# Patient Record
Sex: FEMALE | Race: OTHER
[De-identification: names, ages, dates, MRNs, and addresses within clinical notes are randomized per-mention and may not be internally consistent; named-entity substitution may affect disease eponyms.]

---

## 2017-09-09 ENCOUNTER — HOSPITAL ENCOUNTER (EMERGENCY)
Dept: HOSPITAL 80 - FED | Age: 22
Discharge: HOME | End: 2017-09-09
Payer: SELF-PAY

## 2017-09-09 VITALS — DIASTOLIC BLOOD PRESSURE: 70 MMHG | SYSTOLIC BLOOD PRESSURE: 129 MMHG | HEART RATE: 65 BPM | OXYGEN SATURATION: 97 %

## 2017-09-09 VITALS — TEMPERATURE: 98.6 F | RESPIRATION RATE: 16 BRPM

## 2017-09-09 DIAGNOSIS — E86.9: ICD-10-CM

## 2017-09-09 DIAGNOSIS — R53.83: Primary | ICD-10-CM

## 2017-09-09 LAB
% IMMATURE GRANULYOCYTES: 0.2 % (ref 0–1.1)
ABSOLUTE IMMATURE GRANULOCYTES: 0.01 10^3/UL (ref 0–0.1)
ABSOLUTE NRBC COUNT: 0 10^3/UL (ref 0–0.01)
ADD DIFF?: NO
ADD MORPH?: NO
ADD SCAN?: NO
ANION GAP SERPL CALC-SCNC: 12 MEQ/L (ref 8–16)
ATYPICAL LYMPHOCYTE FLAG: 40 (ref 0–99)
CALCIUM SERPL-MCNC: 9.3 MG/DL (ref 8.5–10.4)
CHLORIDE SERPL-SCNC: 104 MEQ/L (ref 97–110)
CO2 SERPL-SCNC: 27 MEQ/L (ref 22–31)
COLOR UR: YELLOW
CREAT SERPL-MCNC: 0.7 MG/DL (ref 0.6–1)
ERYTHROCYTE [DISTWIDTH] IN BLOOD BY AUTOMATED COUNT: 12 % (ref 11.5–15.2)
FRAGMENT RBC FLAG: 0 (ref 0–99)
GFR SERPL CREATININE-BSD FRML MDRD: > 60 ML/MIN/{1.73_M2}
GLUCOSE SERPL-MCNC: 60 MG/DL (ref 70–100)
HCT VFR BLD CALC: 43.4 % (ref 38–47)
HGB BLD-MCNC: 13.8 G/DL (ref 12.6–16.3)
LEFT SHIFT FLG: 0 (ref 0–99)
LIPEMIA HEMOLYSIS FLAG: 80 (ref 0–99)
MCH RBC BLDCO QN: 31.4 PG (ref 27.9–34.1)
MCHC RBC AUTO-ENTMCNC: 31.8 G/DL (ref 32.4–36.7)
MCV RBC AUTO: 98.9 FL (ref 81.5–99.8)
NITRITE UR QL STRIP: NEGATIVE
NRBC-AUTO%: 0 % (ref 0–0.2)
PH UR STRIP: 7 [PH] (ref 5–7.5)
PLATELET # BLD: 175 10^3/UL (ref 150–400)
PLATELET CLUMPS FLAG: 0 (ref 0–99)
PMV BLD AUTO: 10.6 FL (ref 8.7–11.7)
POTASSIUM SERPL-SCNC: 3.4 MEQ/L (ref 3.5–5.2)
RBC # BLD AUTO: 4.39 10^6/UL (ref 4.18–5.33)
SODIUM SERPL-SCNC: 143 MEQ/L (ref 134–144)
SP GR UR STRIP: 1.02 (ref 1–1.03)

## 2017-09-09 NOTE — EDPHY
H & P


Time Seen by Provider: 09/09/17 18:52


HPI/ROS: 





CHIEF COMPLAINT:  Fatigue, loss of appetite





HISTORY OF PRESENT ILLNESS:  Patient is a 22-year-old female who presents 

emergency department feeling fatigued for the past 4 days.  Patient has a 

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 This was benign.  She 

subsequently had adrenal insufficiency.  She is not currently being treated for 

adrenal insufficiency.  Patient complains of generalized fatigue.  Nausea with 

no vomiting.  No focal abdominal discomfort.  No abdominal pain.  No shortness 

of breath.  No chest pain.  Patient denies dysuria but does have mild 

frequency.  No hematuria.  Patient's last menstrual period was 1 month ago.  

She has 1 day late on her menses.  She denies being sexually active in the past 

month.





REVIEW OF SYSTEMS:  


My complete review of systems is negative except as mentioned in the HPI.


Past Medical/Surgical History: 





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 

vocal cord dysfunction, scoliosis





Past surgical history:  Left adrenal gland removal





Social history:  The patient does not smoke.








Smoking Status: Never smoked


Physical Exam: 








Vitals noted.  Afebrile.


GENERAL:  Well-appearing, in no acute distress, alert.


HEENT:  Eyes normal to inspection, normal pharynx, no signs of dehydration.


NECK:  No thyromegaly, no lymphadenopathy, supple.


RESPIRATORY:  Clear to auscultation bilaterally, no rales, rhonchi or wheezing.


CVS:  Regular rate and rhythm, no rubs, murmurs, or gallops.


ABDOMEN:  Soft, nontender, nondistended, no organomegaly.


BACK:  Normal to inspection, no CVA tenderness.


SKIN:  Normal color, no rash, warm, dry.  No pallor.


EXTREMITIES:  No pedal edema, no calf tenderness, no Homans sign or cords, no 

joint swelling.


NEURO/PSYCH:  Alert and oriented x3, normal mood and affect, normal motor 

sensory exam. 


Constitutional: 


 Initial Vital Signs











Temperature (C)  37.0 C   09/09/17 17:57


 


Heart Rate  80   09/09/17 17:57


 


Respiratory Rate  16   09/09/17 17:57


 


Blood Pressure  130/76 H  09/09/17 17:57


 


O2 Sat (%)  95   09/09/17 17:57








 











O2 Delivery Mode               Room Air














Allergies/Adverse Reactions: 


 





No Known Allergies Allergy (Unverified 09/09/17 17:57)


 








Home Medications: 














 Medication  Instructions  Recorded


 


Omeprazole  09/09/17


 


predniSONE 20 mg PO DAILY 4 Days  tab 09/09/17














Medical Decision Making


ED Course/Re-evaluation: 





In the emergency department I discussed possible etiologies with the patient 

and her mother.  I answered all her questions.  IV was placed.  Patient was 

given normal saline IV for hydration.  Laboratory studies were obtained.





Reviewed the patient's laboratory studies.  She is not anemic.  Her white count 

is normal.  Patient has a mildly low potassium at 3.4.  Breast her Chem panel 

is unremarkable.  Pregnancy is negative.  Urine is negative.





I discussed the results with the patient and her mother.  I had a long 

discussion with possible etiologies.  Because the patient has a history of 

adrenal insufficiency when young she will be given a short course of steroid.  

She will follow up with primary care physician.  She will return to the 

emergency department if her symptoms worsen.  She is given warnings prior to 

leaving.  


Differential Diagnosis: 





My differential includes but is not limited to adrenal insufficiency, 

electrolyte abnormality, sugar abnormality, dehydration, urinary tract infection

, pregnancy, ectopic pregnancy





- Data Points


Laboratory Results: 


 Laboratory Results





 09/09/17 19:14 





 09/09/17 19:14 





 











  09/09/17 09/09/17 09/09/17





  19:14 19:14 19:14


 


WBC      





    


 


RBC      





    


 


Hgb      





    


 


Hct      





    


 


MCV      





    


 


MCH      





    


 


MCHC      





    


 


RDW      





    


 


Plt Count      





    


 


MPV      





    


 


Neut % (Auto)      





    


 


Lymph % (Auto)      





    


 


Mono % (Auto)      





    


 


Eos % (Auto)      





    


 


Baso % (Auto)      





    


 


Nucleat RBC Rel Count      





    


 


Absolute Neuts (auto)      





    


 


Absolute Lymphs (auto)      





    


 


Absolute Monos (auto)      





    


 


Absolute Eos (auto)      





    


 


Absolute Basos (auto)      





    


 


Absolute Nucleated RBC      





    


 


Immature Gran %      





    


 


Immature Gran #      





    


 


Sodium      143 mEq/L mEq/L





     (134-144) 


 


Potassium      3.4 mEq/L L mEq/L





     (3.5-5.2) 


 


Chloride      104 mEq/L mEq/L





     () 


 


Carbon Dioxide      27 mEq/l mEq/l





     (22-31) 


 


Anion Gap      12 mEq/L mEq/L





     (8-16) 


 


BUN      12 mg/dL mg/dL





     (7-23) 


 


Creatinine      0.7 mg/dL mg/dL





     (0.6-1.0) 


 


Estimated GFR      > 60 





    


 


Glucose      60 mg/dL L mg/dL





     () 


 


Calcium      9.3 mg/dL mg/dL





     (8.5-10.4) 


 


Beta HCG, Qual    NEGATIVE   





    


 


Urine Color  YELLOW     





    


 


Urine Appearance  CLEAR     





    


 


Urine pH  7.0     





   (5.0-7.5)   


 


Ur Specific Gravity  1.018     





   (1.002-1.030)   


 


Urine Protein  NEGATIVE     





   (NEGATIVE)   


 


Urine Ketones  NEGATIVE     





   (NEGATIVE)   


 


Urine Blood  NEGATIVE     





   (NEGATIVE)   


 


Urine Nitrate  NEGATIVE     





   (NEGATIVE)   


 


Urine Bilirubin  NEGATIVE     





   (NEGATIVE)   


 


Urine Urobilinogen  NEGATIVE EU EU    





   (0.2-1.0)   


 


Ur Leukocyte Esterase  NEGATIVE     





   (NEGATIVE)   


 


Urine Glucose  NEGATIVE     





   (NEGATIVE)   














  09/09/17





  19:14


 


WBC  6.31 10^3/uL 10^3/uL





   (3.80-9.50) 


 


RBC  4.39 10^6/uL 10^6/uL





   (4.18-5.33) 


 


Hgb  13.8 g/dL g/dL





   (12.6-16.3) 


 


Hct  43.4 % %





   (38.0-47.0) 


 


MCV  98.9 fL fL





   (81.5-99.8) 


 


MCH  31.4 pg pg





   (27.9-34.1) 


 


MCHC  31.8 g/dL L g/dL





   (32.4-36.7) 


 


RDW  12.0 % %





   (11.5-15.2) 


 


Plt Count  175 10^3/uL 10^3/uL





   (150-400) 


 


MPV  10.6 fL fL





   (8.7-11.7) 


 


Neut % (Auto)  52.4 % %





   (39.3-74.2) 


 


Lymph % (Auto)  36.0 % %





   (15.0-45.0) 


 


Mono % (Auto)  8.4 % %





   (4.5-13.0) 


 


Eos % (Auto)  2.2 % %





   (0.6-7.6) 


 


Baso % (Auto)  0.8 % %





   (0.3-1.7) 


 


Nucleat RBC Rel Count  0.0 % %





   (0.0-0.2) 


 


Absolute Neuts (auto)  3.31 10^3/uL 10^3/uL





   (1.70-6.50) 


 


Absolute Lymphs (auto)  2.27 10^3/uL 10^3/uL





   (1.00-3.00) 


 


Absolute Monos (auto)  0.53 10^3/uL 10^3/uL





   (0.30-0.80) 


 


Absolute Eos (auto)  0.14 10^3/uL 10^3/uL





   (0.03-0.40) 


 


Absolute Basos (auto)  0.05 10^3/uL 10^3/uL





   (0.02-0.10) 


 


Absolute Nucleated RBC  0.00 10^3/uL 10^3/uL





   (0-0.01) 


 


Immature Gran %  0.2 % %





   (0.0-1.1) 


 


Immature Gran #  0.01 10^3/uL 10^3/uL





   (0.00-0.10) 


 


Sodium  





  


 


Potassium  





  


 


Chloride  





  


 


Carbon Dioxide  





  


 


Anion Gap  





  


 


BUN  





  


 


Creatinine  





  


 


Estimated GFR  





  


 


Glucose  





  


 


Calcium  





  


 


Beta HCG, Qual  





  


 


Urine Color  





  


 


Urine Appearance  





  


 


Urine pH  





  


 


Ur Specific Gravity  





  


 


Urine Protein  





  


 


Urine Ketones  





  


 


Urine Blood  





  


 


Urine Nitrate  





  


 


Urine Bilirubin  





  


 


Urine Urobilinogen  





  


 


Ur Leukocyte Esterase  





  


 


Urine Glucose  





  











Medications Given: 


 








Discontinued Medications





Sodium Chloride (Ns)  1,000 mls @ 0 mls/hr IV EDNOW ONE; [place]


   PRN Reason: Protocol


   Stop: 09/09/17 19:05


   Last Admin: 09/09/17 19:24 Dose:  1,000 mls








Departure





- Departure


Disposition: Home, Routine, Self-Care


Clinical Impression: 


Fatigue


Qualifiers:


 Fatigue type: unspecified Qualified Code(s): R53.83 - Other fatigue





Condition: Good


Instructions:  Fatigue (ED)


Additional Instructions: 


Return with increasing fatigue, weakness, fever, nausea, vomiting or any other 

concerns.


Referrals: 


Meryl Skinner MD [[facility]] - 2-3 days, call for 

appt.


Prescriptions: 


predniSONE 20 mg PO DAILY 4 Days  tab

## 2018-01-24 ENCOUNTER — HOSPITAL ENCOUNTER (EMERGENCY)
Dept: HOSPITAL 80 - FED | Age: 23
Discharge: HOME | End: 2018-01-24
Payer: COMMERCIAL

## 2018-01-24 VITALS
OXYGEN SATURATION: 98 % | TEMPERATURE: 97.9 F | HEART RATE: 56 BPM | DIASTOLIC BLOOD PRESSURE: 94 MMHG | SYSTOLIC BLOOD PRESSURE: 133 MMHG

## 2018-01-24 VITALS — RESPIRATION RATE: 18 BRPM

## 2018-01-24 DIAGNOSIS — R07.82: Primary | ICD-10-CM

## 2018-01-24 DIAGNOSIS — J45.909: ICD-10-CM

## 2018-01-24 LAB — PLATELET # BLD: 176 10^3/UL (ref 150–400)

## 2018-01-24 NOTE — CPEKG
Heart Rate: 71

RR Interval: 845

P-R Interval: 152

QRSD Interval: 104

QT Interval: 376

QTC Interval: 409

P Axis: 62

QRS Axis: 53

T Wave Axis: 60

EKG Severity - NORMAL ECG -

EKG Impression: SINUS RHYTHM

Electronically Signed By: Cheri Willis 24-Jan-2018 20:43:30

## 2018-01-24 NOTE — EDPHY
H & P


Time Seen by Provider: 01/24/18 15:27


HPI/ROS: 





CHIEF COMPLAINT: Chest pain 





HISTORY OF PRESENT ILLNESS: 





This patient is a 23 year old female with history of adrenal insufficiency and 

mitral valve prolapse complaining of chest pain. Chest pain started yesterday 

and became worse today. The pain has been constant, described as slight 

heaviness with an occasional stinging sensation. It has been constant since 

onset, but waxes and wanes.The patient rates the severity of her discomfort at 7

/10.  No change with exertion.  She cannot identify any alleviating or 

aggravating factors and has not had similar pain in the past.  Associated with 

shortness of breath. She tried her inhaler thinking this may be due to asthma, 

but the sensation remained. She endorses slight cough and a transient sensation 

of lightheadedness at work earlier. She is currently going through a divorce 

and has felt very stressed. Denies pain or swelling in legs. No fever, 

abdominal pain, vomiting, urinary complaints, or other associated symptoms. 





REVIEW OF SYSTEMS:


A 10 point review of systems was performed and is negative with the exception 

of the elements mentioned in the history of present illness. 











Past Medical/Surgical History: 





GERD (Omeprazole) 


Adrenal insufficiency 


Mitral valve prolapse 


Asthma 





Social History: 





Nonsmoker. No alcohol or illicit drug use. 


Smoking Status: Never smoked


Physical Exam: 





General Appearance: Alert, pleasant


Eyes: Pupils equal and round, no conjunctival pallor or injection


ENT, Mouth: Mucous membranes moist


Neck: Normal inspection


Respiratory: Lungs are clear to auscultation


Cardiovascular: Regular rate and rhythm 


Gastrointestinal:  Abdomen is soft and non-tender 


Neurological:  A&O, nonfocal exam


Skin:  Warm and dry, no rash


Extremities:  Nontender, no pedal edema


Psychiatric: Mood and affect normal





Constitutional: 


 Initial Vital Signs











Temperature (C)  36.8 C   01/24/18 15:22


 


Heart Rate  76   01/24/18 15:22


 


Respiratory Rate  18   01/24/18 15:22


 


Blood Pressure  111/59 L  01/24/18 15:22


 


O2 Sat (%)  99   01/24/18 15:22








 











O2 Delivery Mode               Room Air














Allergies/Adverse Reactions: 


 





No Known Allergies Allergy (Verified 01/24/18 15:22)


 








Home Medications: 














 Medication  Instructions  Recorded


 


Omeprazole  09/09/17














Medical Decision Making





- Diagnostics


EKG Interpretation: 





EKG interpreted by me reveals sinus arrhythmia, rate 71, no ST/T changes.  

Interpretation: sinus arrhythmia.





Imaging Results: 


CXR: NAD





Imaging: I viewed and interpreted images myself


ED Course/Re-evaluation: 





24 y/o female presents with chest pain onset yesterday. Exam is unremarkable. 

Plan for EKG, chest x-ray, labs including CBC, chemistries, d-dimer. IV 

established. Plan to administer 15mg IV Toradol for pain relief and 0.5mg IV 

Ativan for anxiety relief. 





EKG shows sinus arrhythmia. Laboratory and radiographic studies unremarkable. D-

dimer negative.  





After careful consideration and evaluation, I find no evidence of acute 

coronary syndrome.  The patient has no risk factors for coronary disease, 

normal EKG and normal studies.  In addition, I feel that I can safely exclude 

pulmonary embolism, with normal vital signs, normal oxygen saturation and no 

risk factors.  In addition there is no evidence of pneumothorax, pneumonia, 

aortic dissection.





16:25 Reassessed patient. She is feeling better following Ativan and Toradol 

administration. Plan to d/c home in good condition. Followup and return 

precautions discussed. She is comfortable with this plan. 


Differential Diagnosis: 





Differential diagnosis includes though it is not limited to pneumonia, 

pneumothorax, pulmonary embolism, aortic dissection, pericarditis, acute 

coronary syndrome.








- Data Points


Laboratory Results: 


 Laboratory Results





 01/24/18 15:50 





 01/24/18 15:50 








Medications Given: 


 








Discontinued Medications





Ketorolac Tromethamine (Toradol)  15 mg IVP EDNOW ONE


   Stop: 01/24/18 15:39


   Last Admin: 01/24/18 16:03 Dose:  15 mg


Lorazepam (Ativan Injection)  0.5 mg IVP EDNOW ONE


   Stop: 01/24/18 15:39


   Last Admin: 01/24/18 16:04 Dose:  0.5 mg








Departure





- Departure


Disposition: Home, Routine, Self-Care


Clinical Impression: 


Chest pain


Qualifiers:


 Chest pain type: intercostal pain Qualified Code(s): R07.82 - Intercostal pain





Condition: Good


Instructions:  Chest Pain (ED)


Additional Instructions: 


1. Based upon the testing done in the Emergency Department today we see no 

evidence of a heart attack.





2. Please follow up with a primary care provider this week for further 

evaluation. 





3. Please return to the Emergency Department immediately for worsening chest 

pain, difficulty breathing or other concerns.





Referrals: 


Cally Casanova MD [Medical Doctor] - As per Instructions


Report Scribed for: Cheri Willis


Report Scribed by: Anai Resendez


Date of Report: 01/24/18


Time of Report: 15:37


Physician Review and Approval Statement: 





01/24/18 15:37


Portions of this note were transcribed by a medical scribe.  I personally 

performed a history, physical exam, medical decision making, and confirmed 

accuracy of information the transcribed note.

## 2018-06-04 ENCOUNTER — HOSPITAL ENCOUNTER (EMERGENCY)
Dept: HOSPITAL 80 - FED | Age: 23
Discharge: HOME | End: 2018-06-04
Payer: COMMERCIAL

## 2018-06-04 VITALS — SYSTOLIC BLOOD PRESSURE: 133 MMHG | DIASTOLIC BLOOD PRESSURE: 77 MMHG

## 2018-06-04 DIAGNOSIS — Z91.040: ICD-10-CM
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
## 2018-06-04 DIAGNOSIS — S69.91XA: Primary | ICD-10-CM
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
## 2018-06-04 NOTE — EDPHY
H & P


Stated Complaint: R HAND INJ/FALLING


Time Seen by Provider: 06/04/18 01:09


HPI/ROS: 





Chief Complaint:  Right hand injury





HPI:  23-year-old woman was playing 4 square with friends this morning when she 

fell onto her outstretched right hand and hyperflexed it.  She had immediate 

onset of pain in her mid hand.  Has a history of prior fractures in the past.  

Denies any other injuries.  No numbness or weakness.  She is on able to use her 

hand secondary to pain.





ROS:  10 point Review of Systems is negative except as noted in the HPI.





Social History: No smoking





Family History: non-contributory





Physical Exam:


General:  Awake, alert, no acute distress


Right upper extremity:  Elbow is nontender, full range of motion without pain.  

Right wrist, no bony tenderness or deformity, decreased range of motion 

secondary to pain in her hand, right hand:  She has tenderness along the 2nd 

and 3rd mid shaft metacarpals.  No carpal tenderness or deformity.  She is 

unable to flex or extend her 3rd 4th and 5th digits secondary to pain.  

Sensations intact in the radial, median and ulnar nerve distribution.  

Capillary refills less than 2 sec.  Normal pulses.


Skin:  No rash








- Personal History


LMP (Females 10-55): 22-28 Days Ago


Current Tetanus Diphtheria and Acellular Pertussis (TDAP): Yes





- Medical/Surgical History


Hx Asthma: No


Hx Chronic Respiratory Disease: No


Hx Diabetes: No


Hx Cardiac Disease: No


Hx Renal Disease: No


Hx Cirrhosis: No


Hx Alcoholism: No


Hx HIV/AIDS: No


Hx Splenectomy or Spleen Trauma: No


Other PMH: mitral valve prolapse/afrenal tumor, ADRENAL INSUF, GERD, VOCAL CORD 

DISFUNCTION, FEMORAL HIP SLIP WILLA





- Social History


Smoking Status: Never smoked


Constitutional: 


 Initial Vital Signs











Temperature (C)  36.7 C   06/04/18 01:02


 


Heart Rate  94   06/04/18 01:02


 


Respiratory Rate  16   06/04/18 01:02


 


Blood Pressure  133/77 H  06/04/18 01:02


 


O2 Sat (%)  97   06/04/18 01:02








 











O2 Delivery Mode               Room Air














Allergies/Adverse Reactions: 


 





latex Allergy (Verified 06/04/18 01:01)


 








Home Medications: 














 Medication  Instructions  Recorded


 


Omeprazole  09/09/17


 


Flonase Nasal [place]  06/04/18


 


Ventolin Hfa Inhaler  06/04/18














Medical Decision Making





- Diagnostics


Imaging Results: 


Hand x-ray shows a questionable old fractures the base of the 4th soft fish 

metacarpal.  No acute fractures to noted.  This is my interpretation.


Imaging: I viewed and interpreted images myself


ED Course/Re-evaluation: 





Patient placed in a volar splint.  Will discharge with follow up with Hand 

surgery, return for any concerns.





Departure





- Departure


Disposition: Home, Routine, Self-Care


Clinical Impression: 


 Hand injury





Condition: Good


Instructions:  Hand Sprain (ED), R.I.C.E. Treatment (ED)


Additional Instructions: 


Leave the splint in place until your seen by the hand surgeon.


Follow up with the hand surgeon in 3-4 days for further evaluation.


Alternate acetaminophen (1000 mg) with ibuprofen (400 mg) every 4 hours as 

needed for pain.


Referrals: 


Siddhartha Hernandez MD [Medical Doctor] - As per Instructions


Stand Alone Forms:  Work Excuse

## 2018-06-06 ENCOUNTER — HOSPITAL ENCOUNTER (EMERGENCY)
Dept: HOSPITAL 80 - FED | Age: 23
Discharge: HOME | End: 2018-06-06
Payer: COMMERCIAL

## 2018-06-06 VITALS — DIASTOLIC BLOOD PRESSURE: 60 MMHG | SYSTOLIC BLOOD PRESSURE: 117 MMHG

## 2018-06-06 DIAGNOSIS — S62.346D: Primary | ICD-10-CM

## 2018-06-06 DIAGNOSIS — Z91.040: ICD-10-CM
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
## 2018-06-06 NOTE — EDPHY
H & P


Time Seen by Provider: 06/06/18 18:45


HPI/ROS: 





CHIEF COMPLAINT:  Right hand pain





HISTORY OF PRESENT ILLNESS: 23-year-old female seen the ER few days ago 

complaining of right hand pain, placed into a sling and given hand surgery 

follow-up information at that time.  She is complaining of continued pain.  She 

has been keeping the hand at dependent position.  Denies discoloration.  Denies 

acute trauma.  She has been working at her job at a fast food restaurant.  She 

did contact the hand surgeon she was referred to but was unable to follow-up as 

she did not have 200 dollars that was needed to be seen.  She is requesting 

other referral information  





PRIMARY CARE PROVIDER:  





REVIEW OF SYSTEMS:


A ten point review of systems was performed and is negative with the exception 

of the items mentioned in the HPI





************


PHYSICAL EXAM





(Prior to examination, patient consented to physical exam, hands were washed 

and my usual and customary physical exam procedures followed)


1) GENERAL: Well-developed, well-nourished, alert and oriented.  Appears to be 

in no acute distress.


2) HEAD: Normocephalic


3) HEENT: Pupils equal, round, reactive to light bilaterally.  


4) LUNGS: Breathing comfortably.   


5) MUSCULOSKELETAL:  Volar Orthoglass splint in place, taken down revealing 

Soft compartments.  Normal coloration.


6) SKIN:  Intact normal color normal temperature.


7) VASCULAR:  pulses and cap refill present are brisk


8) NEUROLOGIC:  Radial, ulnar, median nerve function intact with no deficits 

appreciated on exam 








***************





DIFFERENTIAL DIAGNOSIS:   in no particular order including but not limited to 

fracture, sprain, compartment syndrome





********











Procedure:  Splint 





An ulnar gutter Ortho Glass splint was applied by ER technician.   After 

application of the splint I returned and re-examined the patient.  The splint 

was adequately immobilizing the joint and distal to the splint the patient's 

circulation and sensation were intact.  Patient shows no signs of compartment 

syndrome.  Was given orthopedic precautions.





Smoking Status: Never smoked


Constitutional: 


 Initial Vital Signs











Temperature (C)  36.9 C   06/06/18 18:20


 


Heart Rate  71   06/06/18 18:20


 


Respiratory Rate  17   06/06/18 18:20


 


Blood Pressure  117/60   06/06/18 18:20


 


O2 Sat (%)  98   06/06/18 18:20








 











O2 Delivery Mode               Room Air














Allergies/Adverse Reactions: 


 





latex Allergy (Verified 06/06/18 18:19)


 








Home Medications: 














 Medication  Instructions  Recorded


 


Omeprazole  09/09/17


 


Flonase Nasal [place]  06/04/18


 


Ventolin Hfa Inhaler  06/04/18


 


Hydrocodone/APAP 5/325 [Norco 1 tab PO Q6 PRN #7 tab 06/06/18





5/325 (RX)]  














MDM/Departure





- [facility]


ED Course/Re-evaluation: 





I reviewed the patient's old medical records including her x-ray results 

showing an "acute partially comminuted and minimally displaced metaepiphyseal 

fracture of the base of the fifth metacarpal extending to the articular surface.

"Her splint has been taken down and an ulnar gutter splint has been built with 

padding.  She has no evidence of compartment syndrome at this time.  She is 

neurovascular intact.  She was previously given hand surgery follow-up 

information with Dr. Iris Borden but notes that she is unable to afford this 

and we discussed options including [facility], [facility], [facility], although I have informed patient that I could not 





- Depart


Disposition: Home, Routine, Self-Care


Clinical Impression: 


Fracture of fifth metacarpal bone of right hand


Qualifiers:


 Encounter type: subsequent encounter Fracture type: closed Metacarpal location

: base Fracture alignment: nondisplaced Fracture healing: with routine healing 

Qualified Code(s): S62.346D - Nondisplaced fracture of base of fifth metacarpal 

bone, right hand, subsequent encounter for fracture with routine healing





Condition: Good


Instructions:  Hand Fracture (ED)


Additional Instructions: 


Return to the ER immediately if you experience discoloration, have worsening 

pain, numbness, tingling, or any other symptoms that concern you.  If you 

received x-rays in the emergency department today, be advised, that ligamentous

, tendon, muscular, and other non-bony injury cannot be fully ruled out. Try to 

keep your affected extremity elevated above the level of your chest, and keep 

cold packs on the affected area, for the next 48 hours.


Stand Alone Forms:  Work Limited Duty


Prescriptions: 


Hydrocodone/APAP 5/325 [Norco 5/325 (RX)] 1 tab PO Q6 PRN #7 tab


 PRN Reason: Pain, Severe


Referrals: 


Siddhartha Hernandez MD [Medical Doctor] - As per Instructions


[facility],. [Clinic] - 2-3 days, call for appt.

## 2019-02-18 ENCOUNTER — HOSPITAL ENCOUNTER (EMERGENCY)
Dept: HOSPITAL 80 - FED | Age: 24
Discharge: HOME | End: 2019-02-18
Payer: COMMERCIAL

## 2019-02-18 VITALS — SYSTOLIC BLOOD PRESSURE: 124 MMHG | DIASTOLIC BLOOD PRESSURE: 71 MMHG

## 2019-02-18 DIAGNOSIS — E86.9: ICD-10-CM

## 2019-02-18 DIAGNOSIS — G43.009: Primary | ICD-10-CM

## 2019-02-18 NOTE — EDPHY
H & P


Stated Complaint: MIGRAINE SINCE SAT, VOMIT TONIGHT


Time Seen by Provider: 02/18/19 19:12


HPI/ROS: 





CHIEF COMPLAINT: Migraine





HISTORY OF PRESENT ILLNESS: The patient is a 25 y/o female with a history of 

migraines arriving with her friend complaining of persistent migraine since 

Saturday afternoon, 2 days ago. She complains of a frontal headache, nausea 

with vomiting today, photophobia, and sound sensitivity. Symptoms are worse 

with movement and did not improve with two doses of Tylenol today. These 

symptoms are consistent with prior migraines, but she has not had a migraine 

for several years. She denies abdominal pain, fever, cough, diarrhea, weakness, 

paresthesias, speech difficulty. No recent trauma. She had a cold one week ago. 





REVIEW OF SYSTEMS:


A ten system review of systems was performed and is negative with the exception 

of the items mentioned in the HPI.





Past medical history: mitral valve prolapse, adrenal insufficiency, GERD, vocal 

cord dysfunction, anxiety, depression





Past surgical history: Denies





Family history: Noncontributory





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 Lives in [place]. 





General Appearance:  Alert.  Vital signs reviewed. Sitting in a dark room, 

sunglasses on.


Eyes:  Pupils equal and round, no conjunctival injection, no discharge. 

Anicteric. Eyes covered. 


ENT, Mouth:  Mucous membranes are moist, no oropharyngeal erythema or edema.


Neck:  No lymphadenopathy, supple. No meningeal signs.


Respiratory:  Lungs are clear to auscultation; no wheezes, rales, or rhonchi.


Cardiovascular:  Regular rate and rhythm; no murmur, rub, or gallop.


Gastrointestinal:  Abdomen is soft and nontender, no masses or organomegaly, 

bowel sounds normal.


Skin:  Warm and dry, no rashes on exposed skin, normal color.


Back:  Nontender to palpation over the thoracolumbar spine. No CVAT.


Extremities:  No lower extremity edema, no calf tenderness or swelling.


Neurological:  Alert and oriented.  Moving all four extremities easily and 

equally. Normal light touch sensation in all 4 extremities. PERRL.  EOMI.  

Tongue midline.  Facial sensation intact to light touch. Hearing intact to 

spoken voice.  Speech fluent.


Psychiatric:  Normal affect.





- Personal History


LMP (Females 10-55): 1-7 Days Ago


Current Tetanus/Diphtheria Vaccine: Yes





- Medical/Surgical History


Hx Asthma: No


Hx Chronic Respiratory Disease: No


Hx Diabetes: No


Hx Cardiac Disease: No


Hx Renal Disease: No


Hx Cirrhosis: No


Hx Alcoholism: No


Hx HIV/AIDS: No


Hx Splenectomy or Spleen Trauma: No


Other PMH: mitral valve prolapse/afrenal tumor, ADRENAL INSUF, GERD, VOCAL CORD 

DISFUNCTION, FEMORAL HIP SLIP WILLA, ANXIETY, DEPRESSION





- Social History


Smoking Status: Never smoked


Constitutional: 


 Initial Vital Signs











Temperature (C)  36.7 C   02/18/19 19:00


 


Heart Rate  73   02/18/19 19:00


 


Respiratory Rate  18   02/18/19 19:00


 


Blood Pressure  125/82 H  02/18/19 19:00


 


O2 Sat (%)  99   02/18/19 19:00








 











O2 Delivery Mode               Room Air














Allergies/Adverse Reactions: 


 





latex Allergy (Verified 02/18/19 18:59)


 


nickel Allergy (Verified 02/18/19 18:59)


 








Home Medications: 














 Medication  Instructions  Recorded


 


Omeprazole  09/09/17


 


Flonase Nasal [place]  06/04/18


 


Ventolin Hfa Inhaler  06/04/18


 


Escitalopram Oxalate  02/18/19


 


Hydroxyzine HCl  02/18/19














Medical Decision Making


ED Course/Re-evaluation: 





25 y/o female with history of migraines presents with a 2-day history of her 

typical migraine headache. Nonfocal neuro exam. Plan for migraine cocktail with 

12.5mg IV Phenergan, 25mg IV Benadryl, 30mg IV Toradol, 1L IV NS. 





2025: Reassessed patient. She is sleeping comfortably. 





2055: Reassessed patient. She is feeling significantly improved and feels ready 

to return home. Recommended standard migraine care and follow up instructions. 

Return precautions discussed. She is comfortable with this plan.





I do not suspect ICH, SAH, infection, other sinister cause of headache.


Differential Diagnosis: 





Headache including but not limited to subarachnoid hemorrhage, migraine headache

, tension headache and infectious causes such as meningitis, pharyngitis and 

sinusitis.





- Data Points


Medications Given: 


 








Discontinued Medications





Diphenhydramine HCl (Benadryl Injection)  25 mg IVP EDNOW ONE


   Stop: 02/18/19 19:39


   Last Admin: 02/18/19 19:48 Dose:  25 mg


Sodium Chloride (Ns)  1,000 mls @ 0 mls/hr IV ONCE ONE; Wide Open


   PRN Reason: Protocol


   Stop: 02/18/19 19:39


   Last Admin: 02/18/19 19:41 Dose:  1,000 mls


Ketorolac Tromethamine (Toradol)  30 mg IVP EDNOW ONE


   Stop: 02/18/19 19:39


   Last Admin: 02/18/19 19:50 Dose:  30 mg


Promethazine HCl (Phenergan)  12.5 mg IVP EDNOW ONE


   Stop: 02/18/19 19:41


   Last Admin: 02/18/19 19:49 Dose:  12.5 mg








Departure





- Departure


Disposition: Home, Routine, Self-Care


Clinical Impression: 


Migraine


Qualifiers:


 Migraine type: without aura Status migrainosus presence: without status 

migrainosus Intractability: not intractable Qualified Code(s): G43.009 - 

Migraine without aura, not intractable, without status migrainosus





Condition: Good


Instructions:  Migraine Headache (ED)


Additional Instructions: 


Follow up with primary care provider in the next 2-3 days.





Return to the ED for worsening of condition. 


Referrals: 


JARRELL JIMENEZ [Primary Care Provider] - As per Instructions


Report Scribed for: Yudith Noel


Report Scribed by: Krissy Chaidez


Date of Report: 02/18/19


Time of Report: 19:44


Physician Review and Approval Statement: 





02/18/19 19:12


Portions of this note were transcribed by the medical scribe.  I, Dr. Yudith Noel, personally performed the history, physical exam, and medical decision-

making; and confirmed the accuracy of the information in the transcribed note.